# Patient Record
Sex: FEMALE | Race: ASIAN | URBAN - METROPOLITAN AREA
[De-identification: names, ages, dates, MRNs, and addresses within clinical notes are randomized per-mention and may not be internally consistent; named-entity substitution may affect disease eponyms.]

---

## 2022-10-17 ENCOUNTER — OFFICE (OUTPATIENT)
Dept: URBAN - METROPOLITAN AREA CLINIC 103 | Facility: CLINIC | Age: 42
Setting detail: OPHTHALMOLOGY
End: 2022-10-17
Payer: COMMERCIAL

## 2022-10-17 DIAGNOSIS — H16.223: ICD-10-CM

## 2022-10-17 DIAGNOSIS — H44.2E3: ICD-10-CM

## 2022-10-17 DIAGNOSIS — H43.393: ICD-10-CM

## 2022-10-17 DIAGNOSIS — H52.4: ICD-10-CM

## 2022-10-17 PROCEDURE — 92015 DETERMINE REFRACTIVE STATE: CPT | Performed by: OPHTHALMOLOGY

## 2022-10-17 PROCEDURE — 92004 COMPRE OPH EXAM NEW PT 1/>: CPT | Performed by: OPHTHALMOLOGY

## 2022-10-17 ASSESSMENT — REFRACTION_CURRENTRX
OD_ADD: +1.00
OS_VPRISM_DIRECTION: PROGS
OS_AXIS: 161
OD_OVR_VA: 20/
OD_AXIS: 012
OS_CYLINDER: -1.25
OS_SPHERE: -8.00
OD_VPRISM_DIRECTION: PROGS
OD_CYLINDER: -1.50
OS_OVR_VA: 20/
OD_SPHERE: -8.00
OS_ADD: +1.00

## 2022-10-17 ASSESSMENT — KERATOMETRY
OD_AXISANGLE_DEGREES: 005
OS_AXISANGLE_DEGREES: 094
OS_K1POWER_DIOPTERS: 44.75
OD_K1POWER_DIOPTERS: 44.25
OS_K2POWER_DIOPTERS: 45.50
OD_K2POWER_DIOPTERS: 44.50

## 2022-10-17 ASSESSMENT — REFRACTION_MANIFEST
OD_AXIS: 050
OS_SPHERE: -8.00
OS_AXIS: 155
OS_ADD: +1.00
OS_VA1: 20/20
OD_VA2: 20/20
OD_VA1: 20/20
OD_ADD: +1.00
OD_SPHERE: -7.75
OU_VA: 20/20
OS_VA2: 20/20
OS_CYLINDER: -0.25
OD_CYLINDER: -0.25

## 2022-10-17 ASSESSMENT — REFRACTION_AUTOREFRACTION
OS_AXIS: 157
OS_SPHERE: -8.25
OD_CYLINDER: -0.50
OD_AXIS: 046
OS_CYLINDER: -0.50
OD_SPHERE: -8.00

## 2022-10-17 ASSESSMENT — AXIALLENGTH_DERIVED
OS_AL: 26.6597
OD_AL: 26.7006
OD_AL: 26.889
OS_AL: 26.4744

## 2022-10-17 ASSESSMENT — TONOMETRY
OS_IOP_MMHG: 17
OD_IOP_MMHG: 17

## 2022-10-17 ASSESSMENT — SPHEQUIV_DERIVED
OS_SPHEQUIV: -8.5
OD_SPHEQUIV: -7.875
OD_SPHEQUIV: -8.25
OS_SPHEQUIV: -8.125

## 2022-10-17 ASSESSMENT — SUPERFICIAL PUNCTATE KERATITIS (SPK)
OD_SPK: 1+
OS_SPK: 1+

## 2022-10-17 ASSESSMENT — VISUAL ACUITY
OS_BCVA: 20/20
OD_BCVA: 20/20

## 2024-01-12 PROBLEM — Z00.00 ENCOUNTER FOR PREVENTIVE HEALTH EXAMINATION: Status: ACTIVE | Noted: 2024-01-12

## 2024-01-16 ENCOUNTER — APPOINTMENT (OUTPATIENT)
Dept: OBGYN | Facility: CLINIC | Age: 44
End: 2024-01-16
Payer: COMMERCIAL

## 2024-01-16 DIAGNOSIS — Z87.42 PERSONAL HISTORY OF OTHER DISEASES OF THE FEMALE GENITAL TRACT: ICD-10-CM

## 2024-01-16 DIAGNOSIS — Z78.9 OTHER SPECIFIED HEALTH STATUS: ICD-10-CM

## 2024-01-16 DIAGNOSIS — Z01.419 ENCOUNTER FOR GYNECOLOGICAL EXAMINATION (GENERAL) (ROUTINE) W/OUT ABNORMAL FINDINGS: ICD-10-CM

## 2024-01-16 PROCEDURE — 99202 OFFICE O/P NEW SF 15 MIN: CPT | Mod: 25

## 2024-01-16 PROCEDURE — 99386 PREV VISIT NEW AGE 40-64: CPT

## 2024-01-16 RX ORDER — SPIRONOLACTONE 50 MG/1
50 TABLET ORAL
Refills: 0 | Status: ACTIVE | COMMUNITY

## 2024-01-16 RX ORDER — MINOXIDIL 2.5 MG/1
2.5 TABLET ORAL
Refills: 0 | Status: ACTIVE | COMMUNITY

## 2024-01-16 NOTE — DISCUSSION/SUMMARY
[FreeTextEntry1] : 43yo G0 LMP 12/28 on EZEKIEL (does not know name of OCP) is here to establish GYN care, for annual exam and to discuss irreg menses  AUB:  - Pt to stop COCs as of today  - Repeat TVUS (if able to tolerate -- if not the TAUS).  in march (pt going in Feb 1 and unable to get sono prior to this)  - TSH sent today  - Will need visualization of EMS and bx. Will likely not tolerate in office procedure   b/l Ov cysts:  - Repeat TVUS in March (pt going in Feb 1 and unable to get sono prior to this)  - Torsion and rupture precautions   RHM:  - DVS neg x 3 - Dentist, PCP, Derm as discussed  - Rx given for mammo/sono - Offered STI screen today. Pt declined - Encouraged healthy diet, exercise, weight maint.   Sia Eugene MD  Obstetrician/Gynecologist

## 2024-01-16 NOTE — HISTORY OF PRESENT ILLNESS
[Patient reported mammogram was normal] : Patient reported mammogram was normal [FreeTextEntry1] : 45yo G0 LMP 12/28 on EZEKIEL (does not know name of OCP) is here to establish GYN care, for annual exam and to discuss irreg menses. She was started on EZEKIEL 10 days ago for her irregular menses.  She was regular up until 3-4 months ago. She reports mid cycle bleeding vero day 15 -24 of cycle for the last 3 cycles. She had a sono which demonstrates b/l ov cysts. She has been taking [place] and Minoxidil for hair loss.  [Mammogramdate] : 12/2023  [PapSmeardate] : NEVER

## 2024-01-17 ENCOUNTER — TRANSCRIPTION ENCOUNTER (OUTPATIENT)
Age: 44
End: 2024-01-17

## 2024-01-19 LAB — HPV HIGH+LOW RISK DNA PNL CVX: NOT DETECTED

## 2024-02-02 LAB
CYTOLOGY CVX/VAG DOC THIN PREP: NORMAL
DATE COLLECTED: NORMAL
HEMOCCULT SP1 STL QL: NEGATIVE
QUALITY CONTROL: YES
TSH SERPL-ACNC: 4.18 UIU/ML

## 2024-03-27 ENCOUNTER — ASOB RESULT (OUTPATIENT)
Age: 44
End: 2024-03-27

## 2024-03-27 ENCOUNTER — APPOINTMENT (OUTPATIENT)
Dept: ANTEPARTUM | Facility: CLINIC | Age: 44
End: 2024-03-27
Payer: COMMERCIAL

## 2024-03-27 PROCEDURE — 76830 TRANSVAGINAL US NON-OB: CPT

## 2024-03-27 PROCEDURE — 76856 US EXAM PELVIC COMPLETE: CPT | Mod: 59

## 2024-04-17 ENCOUNTER — APPOINTMENT (OUTPATIENT)
Dept: OBGYN | Facility: CLINIC | Age: 44
End: 2024-04-17
Payer: COMMERCIAL

## 2024-04-17 VITALS
WEIGHT: 115 LBS | SYSTOLIC BLOOD PRESSURE: 118 MMHG | HEIGHT: 62 IN | DIASTOLIC BLOOD PRESSURE: 83 MMHG | BODY MASS INDEX: 21.16 KG/M2

## 2024-04-17 DIAGNOSIS — N93.9 ABNORMAL UTERINE AND VAGINAL BLEEDING, UNSPECIFIED: ICD-10-CM

## 2024-04-17 DIAGNOSIS — N92.0 EXCESSIVE AND FREQUENT MENSTRUATION WITH REGULAR CYCLE: ICD-10-CM

## 2024-04-17 PROCEDURE — 99213 OFFICE O/P EST LOW 20 MIN: CPT

## 2024-04-17 NOTE — HISTORY OF PRESENT ILLNESS
[FreeTextEntry1] : 42 yo G0 presents for follow up of TVUS ordered by Dr. Eugene for evaluation of irregular menses and mid cycle bleeding. Hx bilateral ovarian cysts. Taking spironolactone and minoxidil for hair loss. Was advised to d/c COCs at last visit with Dr. Eugene January, 2024.  TVUS of 3/27/24: Heterogeneous uterus.   Myomas:  Posterior Right lat  3.1       3.6       2.3       Subserosal  Fundal Right lat     1.6       1.2       1.3       Intramural  Endometrial lining measures 5 mm.  Ovaries appear WNL.  No adnexal mass seen.  No free fluid noted.  TSH WNL [Y] : Patient reports abnormal menses [Excessive Bleeding] : there was excessive bleeding [Irregular Cycle Intervals] : are  irregular [N] : Patient denies prior pregnancies [PapSmeardate] : 1/16/24 [TextBox_102] : irregular [LMPDate] : 4/13/24

## 2024-04-17 NOTE — DISCUSSION/SUMMARY
[FreeTextEntry1] : We discussed possibility that her irregular bleeding may be related to spironolactone which she has been taking for over a year. As per Dr. Eugene, I have advised Endosee and EMB which will need to be performed in hospital secondary to anticipation of intolerance of pain. We discussed presence of two small myomas and it is also possible bleeding r/t these For MD consult to discuss procedure and schedule  Patient verbalizes understanding of and agreement with this plan.  All questions answered to patient's satisfaction.

## 2024-04-24 ENCOUNTER — APPOINTMENT (OUTPATIENT)
Dept: OBGYN | Facility: CLINIC | Age: 44
End: 2024-04-24

## 2024-12-25 PROBLEM — F10.90 ALCOHOL USE: Status: ACTIVE | Noted: 2024-01-16

## 2025-01-28 ENCOUNTER — APPOINTMENT (OUTPATIENT)
Dept: OBGYN | Facility: CLINIC | Age: 45
End: 2025-01-28
Payer: COMMERCIAL

## 2025-01-28 VITALS
BODY MASS INDEX: 21.16 KG/M2 | WEIGHT: 115 LBS | HEIGHT: 62 IN | DIASTOLIC BLOOD PRESSURE: 78 MMHG | SYSTOLIC BLOOD PRESSURE: 110 MMHG

## 2025-01-28 DIAGNOSIS — N83.8 OTHER NONINFLAMMATORY DISORDERS OF OVARY, FALLOPIAN TUBE AND BROAD LIGAMENT: ICD-10-CM

## 2025-01-28 PROCEDURE — 99213 OFFICE O/P EST LOW 20 MIN: CPT

## 2025-02-20 ENCOUNTER — OFFICE (OUTPATIENT)
Dept: URBAN - METROPOLITAN AREA CLINIC 103 | Facility: CLINIC | Age: 45
Setting detail: OPHTHALMOLOGY
End: 2025-02-20
Payer: COMMERCIAL

## 2025-02-20 VITALS — BODY MASS INDEX: 21.16 KG/M2 | HEIGHT: 62 IN | WEIGHT: 115 LBS

## 2025-02-20 DIAGNOSIS — H00.12: ICD-10-CM

## 2025-02-20 DIAGNOSIS — H16.223: ICD-10-CM

## 2025-02-20 DIAGNOSIS — H52.7: ICD-10-CM

## 2025-02-20 DIAGNOSIS — H43.393: ICD-10-CM

## 2025-02-20 DIAGNOSIS — H44.2E3: ICD-10-CM

## 2025-02-20 PROCEDURE — 92015 DETERMINE REFRACTIVE STATE: CPT | Performed by: OPHTHALMOLOGY

## 2025-02-20 PROCEDURE — 92014 COMPRE OPH EXAM EST PT 1/>: CPT | Performed by: OPHTHALMOLOGY

## 2025-02-20 PROCEDURE — 92250 FUNDUS PHOTOGRAPHY W/I&R: CPT | Performed by: OPHTHALMOLOGY

## 2025-02-20 ASSESSMENT — KERATOMETRY
OS_AXISANGLE_DEGREES: 093
OS_K2POWER_DIOPTERS: 45.00
OD_AXISANGLE_DEGREES: 082
OS_K1POWER_DIOPTERS: 44.50
OD_K2POWER_DIOPTERS: 44.50
OD_K1POWER_DIOPTERS: 44.25

## 2025-02-20 ASSESSMENT — REFRACTION_MANIFEST
OD_AXIS: 050
OD_VA1: 20/20
OS_SPHERE: -8.00
OD_VA2: 20/20
OD_CYLINDER: -0.75
OD_ADD: +1.00
OS_CYLINDER: -0.25
OD_CYLINDER: -0.25
OS_ADD: +1.00
OD_VA1: 20/20
OD_SPHERE: -7.75
OD_AXIS: 065
OS_AXIS: 140
OS_VA2: 20/20
OS_SPHERE: -8.25
OS_VA1: 20/20
OS_ADD: +1.00
OS_AXIS: 155
OD_SPHERE: -7.75
OS_VA1: 20/20-
OS_CYLINDER: -0.50
OU_VA: 20/20
OD_ADD: +1.00

## 2025-02-20 ASSESSMENT — REFRACTION_CURRENTRX
OD_CYLINDER: -0.75
OD_VPRISM_DIRECTION: SV
OS_SPHERE: -7.75
OS_AXIS: 155
OS_VPRISM_DIRECTION: SV
OD_OVR_VA: 20/
OD_SPHERE: -7.25
OS_CYLINDER: -0.75
OS_OVR_VA: 20/
OD_AXIS: 39

## 2025-02-20 ASSESSMENT — SUPERFICIAL PUNCTATE KERATITIS (SPK)
OD_SPK: 1+
OS_SPK: 1+

## 2025-02-20 ASSESSMENT — VISUAL ACUITY
OD_BCVA: 20/25
OS_BCVA: 20/30-1

## 2025-02-20 ASSESSMENT — REFRACTION_AUTOREFRACTION
OS_CYLINDER: -0.50
OD_SPHERE: -8.00
OD_CYLINDER: -0.75
OS_AXIS: 139
OS_SPHERE: -8.50
OD_AXIS: 064

## 2025-02-20 ASSESSMENT — TONOMETRY
OS_IOP_MMHG: 16
OD_IOP_MMHG: 16

## 2025-02-20 ASSESSMENT — CONFRONTATIONAL VISUAL FIELD TEST (CVF)
OS_FINDINGS: FULL
OD_FINDINGS: FULL